# Patient Record
(demographics unavailable — no encounter records)

---

## 2024-12-13 NOTE — HISTORY OF PRESENT ILLNESS
[de-identified] : 33-year-old male had injury to his right ring finger in an accident at work as a .  Comes in today for evaluation.  Injury occurred about 12 days ago.

## 2024-12-13 NOTE — ASSESSMENT
[FreeTextEntry1] : Patient had a contusion to his right ring finger.  He is functioning well.  Small abrasions to the dorsal aspect and radial aspect of the finger.  He will see me back on an as-needed basis.  Returned back to normal work activities.

## 2024-12-13 NOTE — PHYSICAL EXAM
[de-identified] : Patient good range of motion of the finger.  Normal capillary refill.  No erythema ecchymoses there are abrasions to the ring finger on the dorsal aspect and the radial aspect little bit of tingling to the finger but has sensation normal capillary refill

## 2025-01-13 NOTE — DATA REVIEWED
[FreeTextEntry1] : His MRI is reviewed documenting a ring finger proximal phalanx nondisplaced fracture

## 2025-01-13 NOTE — PHYSICAL EXAM
[de-identified] : Patient is able to make a fist.  She is able to extend the fingers.  Mild tenderness to palpation at the fracture site.  No erythema ecchymoses or abrasions.

## 2025-01-13 NOTE — ASSESSMENT
[FreeTextEntry1] : Patient is a ring finger nondisplaced proximal phalanx fracture.  The plan will be return to normal activities as a [employer].  See me back in 6 weeks.  Do a therapy program to become stronger as well.

## 2025-01-13 NOTE — HISTORY OF PRESENT ILLNESS
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  Contusion to the finger.  He did have an MRI done.  MRI documented a nondisplaced proximal phalanx fracture.  He is not currently working.